# Patient Record
Sex: MALE
[De-identification: names, ages, dates, MRNs, and addresses within clinical notes are randomized per-mention and may not be internally consistent; named-entity substitution may affect disease eponyms.]

---

## 2018-04-30 ENCOUNTER — HOSPITAL ENCOUNTER (OUTPATIENT)
Dept: HOSPITAL 95 - PLD | Age: 83
End: 2018-04-30
Attending: DERMATOLOGY
Payer: MEDICARE

## 2018-04-30 DIAGNOSIS — L72.0: ICD-10-CM

## 2018-04-30 DIAGNOSIS — C44.41: Primary | ICD-10-CM

## 2019-04-15 ENCOUNTER — HOSPITAL ENCOUNTER (OUTPATIENT)
Dept: HOSPITAL 95 - PLD | Age: 84
Discharge: HOME | End: 2019-04-15
Attending: DERMATOLOGY
Payer: MEDICARE

## 2019-04-15 DIAGNOSIS — D48.5: Primary | ICD-10-CM

## 2019-09-03 ENCOUNTER — HOSPITAL ENCOUNTER (OUTPATIENT)
Dept: HOSPITAL 95 - PLD | Age: 84
Discharge: HOME | End: 2019-09-03
Attending: DERMATOLOGY
Payer: MEDICARE

## 2019-09-03 DIAGNOSIS — D48.5: Primary | ICD-10-CM

## 2019-10-21 ENCOUNTER — HOSPITAL ENCOUNTER (OUTPATIENT)
Dept: HOSPITAL 95 - PLD | Age: 84
Discharge: HOME | End: 2019-10-21
Attending: DERMATOLOGY
Payer: MEDICARE

## 2019-10-21 DIAGNOSIS — C44.319: Primary | ICD-10-CM

## 2020-04-22 ENCOUNTER — HOSPITAL ENCOUNTER (OUTPATIENT)
Dept: HOSPITAL 95 - LAB SHORT | Age: 85
Discharge: HOME | End: 2020-04-22
Attending: DERMATOLOGY
Payer: MEDICARE

## 2020-04-22 DIAGNOSIS — D36.7: Primary | ICD-10-CM

## 2020-09-09 ENCOUNTER — HOSPITAL ENCOUNTER (OUTPATIENT)
Dept: HOSPITAL 95 - PLD | Age: 85
Discharge: HOME | End: 2020-09-09
Attending: DERMATOLOGY
Payer: MEDICARE

## 2020-09-09 DIAGNOSIS — D48.5: Primary | ICD-10-CM

## 2021-03-08 ENCOUNTER — HOSPITAL ENCOUNTER (OUTPATIENT)
Dept: HOSPITAL 95 - LAB SHORT | Age: 86
End: 2021-03-08
Attending: DERMATOLOGY
Payer: MEDICARE

## 2021-03-08 DIAGNOSIS — D48.5: Primary | ICD-10-CM

## 2021-06-16 ENCOUNTER — HOSPITAL ENCOUNTER (OUTPATIENT)
Dept: HOSPITAL 95 - ORSCMMR | Age: 86
Discharge: HOME | End: 2021-06-16
Attending: INTERNAL MEDICINE
Payer: MEDICARE

## 2021-06-16 VITALS — BODY MASS INDEX: 27.3 KG/M2 | WEIGHT: 190.7 LBS | HEIGHT: 70 IN

## 2021-06-16 DIAGNOSIS — R19.4: Primary | ICD-10-CM

## 2021-06-16 DIAGNOSIS — Z79.82: ICD-10-CM

## 2021-06-16 DIAGNOSIS — I25.2: ICD-10-CM

## 2021-06-16 DIAGNOSIS — R63.0: ICD-10-CM

## 2021-06-16 DIAGNOSIS — I25.10: ICD-10-CM

## 2021-06-16 DIAGNOSIS — E11.9: ICD-10-CM

## 2021-06-16 DIAGNOSIS — K57.30: ICD-10-CM

## 2021-06-16 DIAGNOSIS — I10: ICD-10-CM

## 2021-06-16 DIAGNOSIS — K21.9: ICD-10-CM

## 2021-06-16 DIAGNOSIS — Z79.01: ICD-10-CM

## 2021-06-16 DIAGNOSIS — R63.4: ICD-10-CM

## 2021-06-16 DIAGNOSIS — Z79.899: ICD-10-CM

## 2021-06-16 PROCEDURE — A9270 NON-COVERED ITEM OR SERVICE: HCPCS

## 2021-06-16 PROCEDURE — 0DBM8ZX EXCISION OF DESCENDING COLON, VIA NATURAL OR ARTIFICIAL OPENING ENDOSCOPIC, DIAGNOSTIC: ICD-10-PCS | Performed by: INTERNAL MEDICINE

## 2021-06-16 PROCEDURE — 0DBH8ZX EXCISION OF CECUM, VIA NATURAL OR ARTIFICIAL OPENING ENDOSCOPIC, DIAGNOSTIC: ICD-10-PCS | Performed by: INTERNAL MEDICINE

## 2021-06-16 PROCEDURE — 0DB78ZX EXCISION OF STOMACH, PYLORUS, VIA NATURAL OR ARTIFICIAL OPENING ENDOSCOPIC, DIAGNOSTIC: ICD-10-PCS | Performed by: INTERNAL MEDICINE

## 2021-06-16 PROCEDURE — 0DBK8ZX EXCISION OF ASCENDING COLON, VIA NATURAL OR ARTIFICIAL OPENING ENDOSCOPIC, DIAGNOSTIC: ICD-10-PCS | Performed by: INTERNAL MEDICINE

## 2021-06-16 PROCEDURE — 0DB98ZX EXCISION OF DUODENUM, VIA NATURAL OR ARTIFICIAL OPENING ENDOSCOPIC, DIAGNOSTIC: ICD-10-PCS | Performed by: INTERNAL MEDICINE

## 2021-06-16 PROCEDURE — 0DB48ZX EXCISION OF ESOPHAGOGASTRIC JUNCTION, VIA NATURAL OR ARTIFICIAL OPENING ENDOSCOPIC, DIAGNOSTIC: ICD-10-PCS | Performed by: INTERNAL MEDICINE

## 2021-06-16 NOTE — NUR
06/16/21 1136 Blocher,David T
History, Chart, Medications and Allergies reviewed before start of
procedure. MONITOR INTACT WITH CONTINUOUS PULSE OXIMETRY AND
INTERMITTENT BP. 3-LEAD EKG REVIEWED WITH PHYSICIAN PRIOR TO START OF
PROCEDURE. O2 VIA N/C INTACT THROUGHOUT SEDATION/PROCEDURE. HURRICAINE
SPRAY TO OROPHARYX. Bite Block Placed. PATIENT DETERMINED TO BE ASA
APPROPRIATE FOR PROPOFOL SEDATION PRIOR TO START OF PROCEDURE BY DR. BAILEY.

## 2021-06-16 NOTE — NUR
Patient up to Ambulate independently. Gait steady.
Discharge instructions reviewed with patient. Patient verbalizes understanding.
Copy given to patient to take home.
Patient States Post-Procedure ride home has been arranged.
Discharged via wheelchair to private car for ride home. D/C instructions given
to wife over phone as well. Pt and wife deny questions. IV D/C'd, cath intact,
dressing applied.

## 2021-06-27 ENCOUNTER — HOSPITAL ENCOUNTER (OUTPATIENT)
Dept: HOSPITAL 95 - MEDS | Age: 86
Setting detail: OBSERVATION
Discharge: HOME | End: 2021-06-27
Attending: INTERNAL MEDICINE | Admitting: INTERNAL MEDICINE
Payer: MEDICARE

## 2021-06-27 VITALS — HEIGHT: 70 IN | WEIGHT: 184.28 LBS | BODY MASS INDEX: 26.38 KG/M2

## 2021-06-27 DIAGNOSIS — K57.30: ICD-10-CM

## 2021-06-27 DIAGNOSIS — I10: ICD-10-CM

## 2021-06-27 DIAGNOSIS — I25.10: ICD-10-CM

## 2021-06-27 DIAGNOSIS — K62.5: Primary | ICD-10-CM

## 2021-06-27 DIAGNOSIS — K63.5: ICD-10-CM

## 2021-06-27 DIAGNOSIS — I25.2: ICD-10-CM

## 2021-06-27 DIAGNOSIS — Z95.5: ICD-10-CM

## 2021-06-27 DIAGNOSIS — Z79.82: ICD-10-CM

## 2021-06-27 DIAGNOSIS — Z86.010: ICD-10-CM

## 2021-06-27 DIAGNOSIS — E11.9: ICD-10-CM

## 2021-06-27 DIAGNOSIS — Z79.84: ICD-10-CM

## 2021-06-27 PROCEDURE — G0379 DIRECT REFER HOSPITAL OBSERV: HCPCS

## 2021-06-27 PROCEDURE — G0378 HOSPITAL OBSERVATION PER HR: HCPCS

## 2021-06-27 NOTE — NUR
06/27/21 0821 Marce Jauregui
History, Chart, Medications and Allergies reviewed before start of
procedure.Patient confirms NPO status and agrees with scheduled
surgery.Patient states colon prep results clear.LUNGS CLEAR T/O TO
AUSCULTATION.MONITOR INTACT WITH CONTINUOUS PULSE OXIMETRY AND
INTERMITTENT BP.3-LEAD EKG REVIEWED WITH PHYSICIAN PRIOR TO START OF
PROCEDURE.O2 VIA N/C INTACT THROUGHOUT SEDATION/PROCEDURE.

## 2021-07-23 ENCOUNTER — HOSPITAL ENCOUNTER (EMERGENCY)
Dept: HOSPITAL 95 - ER | Age: 86
Discharge: HOME | End: 2021-07-23
Payer: MEDICARE

## 2021-07-23 VITALS — HEIGHT: 70 IN | WEIGHT: 191.01 LBS | BODY MASS INDEX: 27.35 KG/M2

## 2021-07-23 DIAGNOSIS — Z79.899: ICD-10-CM

## 2021-07-23 DIAGNOSIS — M54.6: Primary | ICD-10-CM

## 2021-07-23 DIAGNOSIS — Z88.8: ICD-10-CM

## 2021-07-23 DIAGNOSIS — I10: ICD-10-CM

## 2021-07-23 DIAGNOSIS — I25.10: ICD-10-CM

## 2021-07-23 DIAGNOSIS — E11.22: ICD-10-CM

## 2021-07-23 DIAGNOSIS — Z79.02: ICD-10-CM

## 2021-07-23 LAB
ALBUMIN SERPL BCP-MCNC: 3.9 G/DL (ref 3.4–5)
ALBUMIN/GLOB SERPL: 1.1 {RATIO} (ref 0.8–1.8)
ALT SERPL W P-5'-P-CCNC: 27 U/L (ref 12–78)
ANION GAP SERPL CALCULATED.4IONS-SCNC: 4 MMOL/L (ref 6–16)
AST SERPL W P-5'-P-CCNC: 23 U/L (ref 12–37)
BASOPHILS # BLD AUTO: 0.04 K/MM3 (ref 0–0.23)
BASOPHILS NFR BLD AUTO: 0 % (ref 0–2)
BILIRUB SERPL-MCNC: 0.5 MG/DL (ref 0.1–1)
BUN SERPL-MCNC: 15 MG/DL (ref 8–24)
CALCIUM SERPL-MCNC: 8.9 MG/DL (ref 8.5–10.1)
CHLORIDE SERPL-SCNC: 101 MMOL/L (ref 98–108)
CO2 SERPL-SCNC: 28 MMOL/L (ref 21–32)
CREAT SERPL-MCNC: 1.17 MG/DL (ref 0.6–1.2)
DEPRECATED RDW RBC AUTO: 43.6 FL (ref 35.1–46.3)
EOSINOPHIL # BLD AUTO: 0.17 K/MM3 (ref 0–0.68)
EOSINOPHIL NFR BLD AUTO: 1 % (ref 0–6)
ERYTHROCYTE [DISTWIDTH] IN BLOOD BY AUTOMATED COUNT: 13.5 % (ref 11.7–14.2)
GLOBULIN SER CALC-MCNC: 3.7 G/DL (ref 2.2–4)
GLUCOSE SERPL-MCNC: 109 MG/DL (ref 70–99)
HCT VFR BLD AUTO: 34.7 % (ref 37–53)
HGB BLD-MCNC: 11.7 G/DL (ref 13.5–17.5)
IMM GRANULOCYTES # BLD AUTO: 0.09 K/MM3 (ref 0–0.1)
IMM GRANULOCYTES NFR BLD AUTO: 1 % (ref 0–1)
LYMPHOCYTES # BLD AUTO: 1.64 K/MM3 (ref 0.84–5.2)
LYMPHOCYTES NFR BLD AUTO: 10 % (ref 21–46)
MCHC RBC AUTO-ENTMCNC: 33.7 G/DL (ref 31.5–36.5)
MCV RBC AUTO: 89 FL (ref 80–100)
MONOCYTES # BLD AUTO: 1.54 K/MM3 (ref 0.16–1.47)
MONOCYTES NFR BLD AUTO: 10 % (ref 4–13)
NEUTROPHILS # BLD AUTO: 12.64 K/MM3 (ref 1.96–9.15)
NEUTROPHILS NFR BLD AUTO: 78 % (ref 41–73)
NRBC # BLD AUTO: 0.02 K/MM3 (ref 0–0.02)
NRBC BLD AUTO-RTO: 0.1 /100 WBC (ref 0–0.2)
PLATELET # BLD AUTO: 248 K/MM3 (ref 150–400)
POTASSIUM SERPL-SCNC: 3.6 MMOL/L (ref 3.5–5.5)
PROT SERPL-MCNC: 7.6 G/DL (ref 6.4–8.2)
SODIUM SERPL-SCNC: 133 MMOL/L (ref 136–145)
TROPONIN I SERPL-MCNC: <0.015 NG/ML (ref 0–0.04)

## 2022-03-26 ENCOUNTER — HOSPITAL ENCOUNTER (EMERGENCY)
Dept: HOSPITAL 95 - ER | Age: 87
Discharge: HOME | End: 2022-03-26
Payer: MEDICARE

## 2022-03-26 VITALS — WEIGHT: 192 LBS | HEIGHT: 70 IN | BODY MASS INDEX: 27.49 KG/M2

## 2022-03-26 DIAGNOSIS — D64.9: ICD-10-CM

## 2022-03-26 DIAGNOSIS — I25.2: ICD-10-CM
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
## 2022-03-26 DIAGNOSIS — I10: ICD-10-CM

## 2022-03-26 DIAGNOSIS — S29.012A: Primary | ICD-10-CM

## 2022-03-26 DIAGNOSIS — E11.9: ICD-10-CM

## 2022-03-26 DIAGNOSIS — Z79.899: ICD-10-CM

## 2022-03-26 DIAGNOSIS — R07.9: ICD-10-CM

## 2022-03-26 LAB
ALBUMIN SERPL BCP-MCNC: 3.8 G/DL (ref 3.4–5)
ALBUMIN/GLOB SERPL: 1 {RATIO} (ref 0.8–1.8)
ALT SERPL W P-5'-P-CCNC: 26 U/L (ref 12–78)
ANION GAP SERPL CALCULATED.4IONS-SCNC: 5 MMOL/L (ref 6–16)
AST SERPL W P-5'-P-CCNC: 16 U/L (ref 12–37)
BASOPHILS # BLD AUTO: 0.04 K/MM3 (ref 0–0.23)
BASOPHILS NFR BLD AUTO: 1 % (ref 0–2)
BILIRUB SERPL-MCNC: 0.4 MG/DL (ref 0.1–1)
BUN SERPL-MCNC: 19 MG/DL (ref 8–24)
CALCIUM SERPL-MCNC: 8.9 MG/DL (ref 8.5–10.1)
CHLORIDE SERPL-SCNC: 107 MMOL/L (ref 98–108)
CO2 SERPL-SCNC: 26 MMOL/L (ref 21–32)
CREAT SERPL-MCNC: 1.56 MG/DL (ref 0.6–1.2)
DEPRECATED RDW RBC AUTO: 44.6 FL (ref 35.1–46.3)
EOSINOPHIL # BLD AUTO: 0.2 K/MM3 (ref 0–0.68)
EOSINOPHIL NFR BLD AUTO: 2 % (ref 0–6)
ERYTHROCYTE [DISTWIDTH] IN BLOOD BY AUTOMATED COUNT: 14.7 % (ref 11.7–14.2)
GLOBULIN SER CALC-MCNC: 3.9 G/DL (ref 2.2–4)
GLUCOSE SERPL-MCNC: 98 MG/DL (ref 70–99)
HCT VFR BLD AUTO: 34.8 % (ref 37–53)
HGB BLD-MCNC: 10.7 G/DL (ref 13.5–17.5)
IMM GRANULOCYTES # BLD AUTO: 0.02 K/MM3 (ref 0–0.1)
IMM GRANULOCYTES NFR BLD AUTO: 0 % (ref 0–1)
LYMPHOCYTES # BLD AUTO: 2.01 K/MM3 (ref 0.84–5.2)
LYMPHOCYTES NFR BLD AUTO: 24 % (ref 21–46)
MCHC RBC AUTO-ENTMCNC: 30.7 G/DL (ref 31.5–36.5)
MCV RBC AUTO: 83 FL (ref 80–100)
MONOCYTES # BLD AUTO: 1.24 K/MM3 (ref 0.16–1.47)
MONOCYTES NFR BLD AUTO: 15 % (ref 4–13)
NEUTROPHILS # BLD AUTO: 4.89 K/MM3 (ref 1.96–9.15)
NEUTROPHILS NFR BLD AUTO: 58 % (ref 41–73)
NRBC # BLD AUTO: 0 K/MM3 (ref 0–0.02)
NRBC BLD AUTO-RTO: 0 /100 WBC (ref 0–0.2)
PLATELET # BLD AUTO: 291 K/MM3 (ref 150–400)
POTASSIUM SERPL-SCNC: 4 MMOL/L (ref 3.5–5.5)
PROT SERPL-MCNC: 7.7 G/DL (ref 6.4–8.2)
SODIUM SERPL-SCNC: 138 MMOL/L (ref 136–145)

## 2022-04-12 ENCOUNTER — HOSPITAL ENCOUNTER (OUTPATIENT)
Dept: HOSPITAL 95 - LAB SHORT | Age: 87
Discharge: HOME | End: 2022-04-12
Attending: FAMILY MEDICINE
Payer: MEDICARE

## 2022-04-12 DIAGNOSIS — I50.9: Primary | ICD-10-CM

## 2022-04-12 DIAGNOSIS — E78.5: ICD-10-CM

## 2022-04-12 LAB
ALBUMIN SERPL BCP-MCNC: 4 G/DL (ref 3.4–5)
ALBUMIN/GLOB SERPL: 1.1 {RATIO} (ref 0.8–1.8)
ALT SERPL W P-5'-P-CCNC: 26 U/L (ref 12–78)
ANION GAP SERPL CALCULATED.4IONS-SCNC: 5 MMOL/L (ref 6–16)
AST SERPL W P-5'-P-CCNC: 18 U/L (ref 12–37)
BASOPHILS # BLD AUTO: 0.05 K/MM3 (ref 0–0.23)
BASOPHILS NFR BLD AUTO: 1 % (ref 0–2)
BILIRUB SERPL-MCNC: 0.4 MG/DL (ref 0.1–1)
BUN SERPL-MCNC: 19 MG/DL (ref 8–24)
CALCIUM SERPL-MCNC: 8.9 MG/DL (ref 8.5–10.1)
CHLORIDE SERPL-SCNC: 103 MMOL/L (ref 98–108)
CHOLEST SERPL-MCNC: 108 MG/DL (ref 50–200)
CHOLEST/HDLC SERPL: 2.7 {RATIO}
CO2 SERPL-SCNC: 28 MMOL/L (ref 21–32)
CREAT SERPL-MCNC: 1.52 MG/DL (ref 0.6–1.2)
DEPRECATED RDW RBC AUTO: 42.3 FL (ref 35.1–46.3)
EOSINOPHIL # BLD AUTO: 0.19 K/MM3 (ref 0–0.68)
EOSINOPHIL NFR BLD AUTO: 3 % (ref 0–6)
ERYTHROCYTE [DISTWIDTH] IN BLOOD BY AUTOMATED COUNT: 14.5 % (ref 11.7–14.2)
GLOBULIN SER CALC-MCNC: 3.7 G/DL (ref 2.2–4)
GLUCOSE SERPL-MCNC: 107 MG/DL (ref 70–99)
HCT VFR BLD AUTO: 34.7 % (ref 37–53)
HDLC SERPL-MCNC: 40 MG/DL (ref 39–?)
HGB BLD-MCNC: 10.7 G/DL (ref 13.5–17.5)
IMM GRANULOCYTES # BLD AUTO: 0.03 K/MM3 (ref 0–0.1)
IMM GRANULOCYTES NFR BLD AUTO: 0 % (ref 0–1)
LDLC SERPL CALC-MCNC: 18 MG/DL (ref 0–110)
LDLC/HDLC SERPL: 0.5 {RATIO}
LYMPHOCYTES # BLD AUTO: 2.06 K/MM3 (ref 0.84–5.2)
LYMPHOCYTES NFR BLD AUTO: 30 % (ref 21–46)
MCHC RBC AUTO-ENTMCNC: 30.8 G/DL (ref 31.5–36.5)
MCV RBC AUTO: 80 FL (ref 80–100)
MONOCYTES # BLD AUTO: 1.05 K/MM3 (ref 0.16–1.47)
MONOCYTES NFR BLD AUTO: 15 % (ref 4–13)
NEUTROPHILS # BLD AUTO: 3.48 K/MM3 (ref 1.96–9.15)
NEUTROPHILS NFR BLD AUTO: 51 % (ref 41–73)
NRBC # BLD AUTO: 0 K/MM3 (ref 0–0.02)
NRBC BLD AUTO-RTO: 0 /100 WBC (ref 0–0.2)
PLATELET # BLD AUTO: 303 K/MM3 (ref 150–400)
POTASSIUM SERPL-SCNC: 3.5 MMOL/L (ref 3.5–5.5)
PROT SERPL-MCNC: 7.7 G/DL (ref 6.4–8.2)
SODIUM SERPL-SCNC: 136 MMOL/L (ref 136–145)
TRIGL SERPL-MCNC: 250 MG/DL (ref 30–160)
VLDLC SERPL CALC-MCNC: 50 MG/DL (ref 6–32)

## 2022-07-04 ENCOUNTER — HOSPITAL ENCOUNTER (EMERGENCY)
Dept: HOSPITAL 95 - ER | Age: 87
Discharge: HOME | End: 2022-07-04
Payer: MEDICARE

## 2022-07-04 VITALS — BODY MASS INDEX: 27.3 KG/M2 | WEIGHT: 195 LBS | HEIGHT: 71 IN

## 2022-07-04 DIAGNOSIS — I10: ICD-10-CM

## 2022-07-04 DIAGNOSIS — M25.561: Primary | ICD-10-CM
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
## 2022-07-04 DIAGNOSIS — Z79.02: ICD-10-CM

## 2022-07-04 DIAGNOSIS — Z79.82: ICD-10-CM

## 2022-07-04 DIAGNOSIS — Z79.899: ICD-10-CM

## 2022-07-04 DIAGNOSIS — Z95.5: ICD-10-CM

## 2022-07-04 DIAGNOSIS — Z88.8: ICD-10-CM

## 2022-07-04 DIAGNOSIS — Z87.891: ICD-10-CM

## 2022-07-04 DIAGNOSIS — M25.461: ICD-10-CM

## 2023-06-19 ENCOUNTER — HOSPITAL ENCOUNTER (OUTPATIENT)
Dept: HOSPITAL 95 - MHTC | Age: 88
Discharge: HOME | End: 2023-06-19
Attending: NURSE PRACTITIONER
Payer: MEDICARE

## 2023-06-19 VITALS — WEIGHT: 201.06 LBS | HEIGHT: 70 IN | BODY MASS INDEX: 28.78 KG/M2

## 2023-06-19 VITALS — SYSTOLIC BLOOD PRESSURE: 151 MMHG | DIASTOLIC BLOOD PRESSURE: 79 MMHG

## 2023-06-19 VITALS — DIASTOLIC BLOOD PRESSURE: 54 MMHG | SYSTOLIC BLOOD PRESSURE: 157 MMHG

## 2023-06-19 VITALS — SYSTOLIC BLOOD PRESSURE: 129 MMHG | DIASTOLIC BLOOD PRESSURE: 69 MMHG

## 2023-06-19 VITALS — SYSTOLIC BLOOD PRESSURE: 128 MMHG | DIASTOLIC BLOOD PRESSURE: 62 MMHG

## 2023-06-19 VITALS — SYSTOLIC BLOOD PRESSURE: 125 MMHG | DIASTOLIC BLOOD PRESSURE: 70 MMHG

## 2023-06-19 DIAGNOSIS — E78.5: ICD-10-CM

## 2023-06-19 DIAGNOSIS — R53.83: ICD-10-CM

## 2023-06-19 DIAGNOSIS — I11.9: ICD-10-CM

## 2023-06-19 DIAGNOSIS — I25.10: ICD-10-CM

## 2023-06-19 DIAGNOSIS — I35.0: Primary | ICD-10-CM

## 2023-06-19 DIAGNOSIS — R06.09: ICD-10-CM

## 2023-06-19 DIAGNOSIS — Z88.8: ICD-10-CM

## 2023-06-19 PROCEDURE — C1894 INTRO/SHEATH, NON-LASER: HCPCS

## 2023-06-19 PROCEDURE — C1887 CATHETER, GUIDING: HCPCS

## 2023-06-19 PROCEDURE — C1769 GUIDE WIRE: HCPCS

## 2023-06-19 NOTE — NUR
R WRIST TR BAND REMOVED AND PUNCTURE AREA CLEANED /C NS. DOT CLOTH DRSG
PLACED. R WRIST SPLINT REAPPLIED. IV REMOVED. PT AMB OUT OF THE HRT CENTER VIA
/S DIFFICULTY.

## 2023-08-14 ENCOUNTER — HOSPITAL ENCOUNTER (OUTPATIENT)
Dept: HOSPITAL 95 - LAB | Age: 88
Discharge: HOME | End: 2023-08-14
Attending: FAMILY MEDICINE
Payer: MEDICARE

## 2023-08-14 DIAGNOSIS — E78.2: ICD-10-CM

## 2023-08-14 DIAGNOSIS — E11.9: Primary | ICD-10-CM

## 2023-08-14 LAB
ALBUMIN SERPL BCP-MCNC: 3.7 G/DL (ref 3.4–5)
ALBUMIN/GLOB SERPL: 0.9 {RATIO} (ref 0.8–1.8)
ALT SERPL W P-5'-P-CCNC: 23 U/L (ref 12–78)
ANION GAP SERPL CALCULATED.4IONS-SCNC: 7 MMOL/L (ref 6–16)
AST SERPL W P-5'-P-CCNC: 25 U/L (ref 12–37)
BILIRUB SERPL-MCNC: 0.7 MG/DL (ref 0.1–1)
BUN SERPL-MCNC: 11 MG/DL (ref 8–24)
CALCIUM SERPL-MCNC: 8.6 MG/DL (ref 8.5–10.1)
CHLORIDE SERPL-SCNC: 108 MMOL/L (ref 98–108)
CO2 SERPL-SCNC: 25 MMOL/L (ref 21–32)
CREAT SERPL-MCNC: 1.37 MG/DL (ref 0.6–1.2)
GLOBULIN SER CALC-MCNC: 4.1 G/DL (ref 2.2–4)
GLUCOSE SERPL-MCNC: 144 MG/DL (ref 70–99)
POTASSIUM SERPL-SCNC: 3.6 MMOL/L (ref 3.5–5.5)
PROT SERPL-MCNC: 7.8 G/DL (ref 6.4–8.2)
SODIUM SERPL-SCNC: 140 MMOL/L (ref 136–145)

## 2023-09-07 ENCOUNTER — HOSPITAL ENCOUNTER (INPATIENT)
Dept: HOSPITAL 95 - ER | Age: 88
LOS: 3 days | Discharge: HOME | DRG: 308 | End: 2023-09-10
Attending: HOSPITALIST | Admitting: HOSPITALIST
Payer: MEDICARE

## 2023-09-07 VITALS — BODY MASS INDEX: 27.49 KG/M2 | HEIGHT: 70 IN | WEIGHT: 192.02 LBS

## 2023-09-07 DIAGNOSIS — I44.7: ICD-10-CM

## 2023-09-07 DIAGNOSIS — Z95.2: ICD-10-CM

## 2023-09-07 DIAGNOSIS — Z79.899: ICD-10-CM

## 2023-09-07 DIAGNOSIS — I49.1: ICD-10-CM

## 2023-09-07 DIAGNOSIS — D63.1: ICD-10-CM

## 2023-09-07 DIAGNOSIS — Z87.19: ICD-10-CM

## 2023-09-07 DIAGNOSIS — E11.22: ICD-10-CM

## 2023-09-07 DIAGNOSIS — I50.31: ICD-10-CM

## 2023-09-07 DIAGNOSIS — I44.0: Primary | ICD-10-CM

## 2023-09-07 DIAGNOSIS — Z79.82: ICD-10-CM

## 2023-09-07 DIAGNOSIS — I13.0: ICD-10-CM

## 2023-09-07 DIAGNOSIS — Z88.8: ICD-10-CM

## 2023-09-07 DIAGNOSIS — Z95.5: ICD-10-CM

## 2023-09-07 DIAGNOSIS — E78.5: ICD-10-CM

## 2023-09-07 DIAGNOSIS — Z98.890: ICD-10-CM

## 2023-09-07 DIAGNOSIS — N18.31: ICD-10-CM

## 2023-09-07 DIAGNOSIS — Z87.39: ICD-10-CM

## 2023-09-07 DIAGNOSIS — I25.10: ICD-10-CM

## 2023-09-07 DIAGNOSIS — I25.2: ICD-10-CM

## 2023-09-07 DIAGNOSIS — Z90.89: ICD-10-CM

## 2023-09-07 DIAGNOSIS — I44.1: ICD-10-CM

## 2023-09-07 DIAGNOSIS — I35.0: ICD-10-CM

## 2023-09-07 LAB
ALBUMIN SERPL BCP-MCNC: 3.5 G/DL (ref 3.4–5)
ALBUMIN/GLOB SERPL: 0.9 {RATIO} (ref 0.8–1.8)
ALT SERPL W P-5'-P-CCNC: 27 U/L (ref 12–78)
ANION GAP SERPL CALCULATED.4IONS-SCNC: 5 MMOL/L (ref 6–16)
AST SERPL W P-5'-P-CCNC: 16 U/L (ref 12–37)
BASOPHILS # BLD AUTO: 0.05 K/MM3 (ref 0–0.23)
BASOPHILS NFR BLD AUTO: 1 % (ref 0–2)
BILIRUB SERPL-MCNC: 0.3 MG/DL (ref 0.1–1)
BUN SERPL-MCNC: 15 MG/DL (ref 8–24)
CA-I BLD-SCNC: 1.18 MMOL/L (ref 1.1–1.46)
CALCIUM SERPL-MCNC: 9 MG/DL (ref 8.5–10.1)
CHLORIDE BLD-SCNC: 107 MMOL/L (ref 98–108)
CHLORIDE SERPL-SCNC: 112 MMOL/L (ref 98–108)
CO2 BLD-SCNC: 24 MMOL/L (ref 21–32)
CO2 SERPL-SCNC: 24 MMOL/L (ref 21–32)
CREAT BLD-MCNC: 1.6 MG/DL (ref 0.8–1.3)
CREAT SERPL-MCNC: 1.51 MG/DL (ref 0.6–1.2)
DEPRECATED RDW RBC AUTO: 43.3 FL (ref 35.1–46.3)
EOSINOPHIL # BLD AUTO: 0.22 K/MM3 (ref 0–0.68)
EOSINOPHIL NFR BLD AUTO: 3 % (ref 0–6)
ERYTHROCYTE [DISTWIDTH] IN BLOOD BY AUTOMATED COUNT: 14.9 % (ref 11.7–14.2)
GLOBULIN SER CALC-MCNC: 4 G/DL (ref 2.2–4)
GLUCOSE BLDC GLUCOMTR-MCNC: 106 MG/DL (ref 70–99)
GLUCOSE SERPL-MCNC: 109 MG/DL (ref 70–99)
HCT VFR BLD AUTO: 37 % (ref 37–53)
HCT VFR BLD CALC: 35 % (ref 41–53)
HGB BLD CALC-MCNC: 11.9 G/DL (ref 13.5–17.5)
HGB BLD-MCNC: 11.5 G/DL (ref 13.5–17.5)
IMM GRANULOCYTES # BLD AUTO: 0.02 K/MM3 (ref 0–0.1)
IMM GRANULOCYTES NFR BLD AUTO: 0 % (ref 0–1)
LYMPHOCYTES # BLD AUTO: 2.12 K/MM3 (ref 0.84–5.2)
LYMPHOCYTES NFR BLD AUTO: 24 % (ref 21–46)
MCHC RBC AUTO-ENTMCNC: 31.1 G/DL (ref 31.5–36.5)
MCV RBC AUTO: 80 FL (ref 80–100)
MONOCYTES # BLD AUTO: 1.06 K/MM3 (ref 0.16–1.47)
MONOCYTES NFR BLD AUTO: 12 % (ref 4–13)
NEUTROPHILS # BLD AUTO: 5.25 K/MM3 (ref 1.96–9.15)
NEUTROPHILS NFR BLD AUTO: 60 % (ref 41–73)
NRBC # BLD AUTO: 0 K/MM3 (ref 0–0.02)
NRBC BLD AUTO-RTO: 0 /100 WBC (ref 0–0.2)
PLATELET # BLD AUTO: 270 K/MM3 (ref 150–400)
POTASSIUM BLD-SCNC: 3.8 MMOL/L (ref 3.5–5.5)
POTASSIUM SERPL-SCNC: 3.8 MMOL/L (ref 3.5–5.5)
PROT SERPL-MCNC: 7.5 G/DL (ref 6.4–8.2)
SODIUM BLD-SCNC: 140 MMOL/L (ref 135–148)
SODIUM SERPL-SCNC: 141 MMOL/L (ref 136–145)

## 2023-09-07 PROCEDURE — A9270 NON-COVERED ITEM OR SERVICE: HCPCS

## 2023-09-07 PROCEDURE — G0378 HOSPITAL OBSERVATION PER HR: HCPCS

## 2023-09-08 VITALS — SYSTOLIC BLOOD PRESSURE: 154 MMHG | DIASTOLIC BLOOD PRESSURE: 84 MMHG

## 2023-09-08 VITALS — DIASTOLIC BLOOD PRESSURE: 78 MMHG | SYSTOLIC BLOOD PRESSURE: 151 MMHG

## 2023-09-08 VITALS — SYSTOLIC BLOOD PRESSURE: 166 MMHG | DIASTOLIC BLOOD PRESSURE: 82 MMHG

## 2023-09-08 VITALS — DIASTOLIC BLOOD PRESSURE: 86 MMHG | SYSTOLIC BLOOD PRESSURE: 160 MMHG

## 2023-09-08 VITALS — DIASTOLIC BLOOD PRESSURE: 94 MMHG | SYSTOLIC BLOOD PRESSURE: 177 MMHG

## 2023-09-08 VITALS — DIASTOLIC BLOOD PRESSURE: 77 MMHG | SYSTOLIC BLOOD PRESSURE: 149 MMHG

## 2023-09-08 VITALS — DIASTOLIC BLOOD PRESSURE: 80 MMHG | SYSTOLIC BLOOD PRESSURE: 159 MMHG

## 2023-09-08 VITALS — DIASTOLIC BLOOD PRESSURE: 91 MMHG | SYSTOLIC BLOOD PRESSURE: 197 MMHG

## 2023-09-08 VITALS — DIASTOLIC BLOOD PRESSURE: 81 MMHG | SYSTOLIC BLOOD PRESSURE: 152 MMHG

## 2023-09-08 VITALS — SYSTOLIC BLOOD PRESSURE: 150 MMHG | DIASTOLIC BLOOD PRESSURE: 92 MMHG

## 2023-09-08 VITALS — DIASTOLIC BLOOD PRESSURE: 76 MMHG | SYSTOLIC BLOOD PRESSURE: 151 MMHG

## 2023-09-08 VITALS — SYSTOLIC BLOOD PRESSURE: 155 MMHG | DIASTOLIC BLOOD PRESSURE: 82 MMHG

## 2023-09-08 VITALS — SYSTOLIC BLOOD PRESSURE: 169 MMHG | DIASTOLIC BLOOD PRESSURE: 94 MMHG

## 2023-09-08 VITALS — SYSTOLIC BLOOD PRESSURE: 117 MMHG | DIASTOLIC BLOOD PRESSURE: 73 MMHG

## 2023-09-08 VITALS — SYSTOLIC BLOOD PRESSURE: 168 MMHG | DIASTOLIC BLOOD PRESSURE: 99 MMHG

## 2023-09-08 VITALS — DIASTOLIC BLOOD PRESSURE: 118 MMHG | SYSTOLIC BLOOD PRESSURE: 209 MMHG

## 2023-09-08 VITALS — DIASTOLIC BLOOD PRESSURE: 75 MMHG | SYSTOLIC BLOOD PRESSURE: 162 MMHG

## 2023-09-08 VITALS — SYSTOLIC BLOOD PRESSURE: 162 MMHG | DIASTOLIC BLOOD PRESSURE: 80 MMHG

## 2023-09-08 VITALS — SYSTOLIC BLOOD PRESSURE: 164 MMHG | DIASTOLIC BLOOD PRESSURE: 78 MMHG

## 2023-09-08 VITALS — SYSTOLIC BLOOD PRESSURE: 164 MMHG | DIASTOLIC BLOOD PRESSURE: 73 MMHG

## 2023-09-08 VITALS — DIASTOLIC BLOOD PRESSURE: 84 MMHG | SYSTOLIC BLOOD PRESSURE: 152 MMHG

## 2023-09-08 VITALS — DIASTOLIC BLOOD PRESSURE: 86 MMHG | SYSTOLIC BLOOD PRESSURE: 153 MMHG

## 2023-09-08 VITALS — SYSTOLIC BLOOD PRESSURE: 172 MMHG | DIASTOLIC BLOOD PRESSURE: 78 MMHG

## 2023-09-08 VITALS — DIASTOLIC BLOOD PRESSURE: 82 MMHG | SYSTOLIC BLOOD PRESSURE: 160 MMHG

## 2023-09-08 VITALS — DIASTOLIC BLOOD PRESSURE: 80 MMHG | SYSTOLIC BLOOD PRESSURE: 160 MMHG

## 2023-09-08 VITALS — SYSTOLIC BLOOD PRESSURE: 167 MMHG | DIASTOLIC BLOOD PRESSURE: 82 MMHG

## 2023-09-08 VITALS — DIASTOLIC BLOOD PRESSURE: 76 MMHG | SYSTOLIC BLOOD PRESSURE: 142 MMHG

## 2023-09-08 VITALS — DIASTOLIC BLOOD PRESSURE: 78 MMHG | SYSTOLIC BLOOD PRESSURE: 155 MMHG

## 2023-09-08 VITALS — DIASTOLIC BLOOD PRESSURE: 80 MMHG | SYSTOLIC BLOOD PRESSURE: 148 MMHG

## 2023-09-08 VITALS — DIASTOLIC BLOOD PRESSURE: 72 MMHG | SYSTOLIC BLOOD PRESSURE: 153 MMHG

## 2023-09-08 VITALS — DIASTOLIC BLOOD PRESSURE: 86 MMHG | SYSTOLIC BLOOD PRESSURE: 161 MMHG

## 2023-09-08 VITALS — DIASTOLIC BLOOD PRESSURE: 69 MMHG | SYSTOLIC BLOOD PRESSURE: 135 MMHG

## 2023-09-08 VITALS — SYSTOLIC BLOOD PRESSURE: 150 MMHG | DIASTOLIC BLOOD PRESSURE: 104 MMHG

## 2023-09-08 VITALS — DIASTOLIC BLOOD PRESSURE: 63 MMHG | SYSTOLIC BLOOD PRESSURE: 133 MMHG

## 2023-09-08 VITALS — DIASTOLIC BLOOD PRESSURE: 91 MMHG | SYSTOLIC BLOOD PRESSURE: 145 MMHG

## 2023-09-08 VITALS — DIASTOLIC BLOOD PRESSURE: 81 MMHG | SYSTOLIC BLOOD PRESSURE: 169 MMHG

## 2023-09-08 VITALS — SYSTOLIC BLOOD PRESSURE: 145 MMHG | DIASTOLIC BLOOD PRESSURE: 105 MMHG

## 2023-09-08 VITALS — SYSTOLIC BLOOD PRESSURE: 179 MMHG | DIASTOLIC BLOOD PRESSURE: 86 MMHG

## 2023-09-08 VITALS — SYSTOLIC BLOOD PRESSURE: 185 MMHG | DIASTOLIC BLOOD PRESSURE: 91 MMHG

## 2023-09-08 VITALS — SYSTOLIC BLOOD PRESSURE: 168 MMHG | DIASTOLIC BLOOD PRESSURE: 81 MMHG

## 2023-09-08 VITALS — DIASTOLIC BLOOD PRESSURE: 75 MMHG | SYSTOLIC BLOOD PRESSURE: 164 MMHG

## 2023-09-08 VITALS — SYSTOLIC BLOOD PRESSURE: 167 MMHG | DIASTOLIC BLOOD PRESSURE: 100 MMHG

## 2023-09-08 VITALS — SYSTOLIC BLOOD PRESSURE: 150 MMHG | DIASTOLIC BLOOD PRESSURE: 72 MMHG

## 2023-09-08 VITALS — DIASTOLIC BLOOD PRESSURE: 79 MMHG | SYSTOLIC BLOOD PRESSURE: 159 MMHG

## 2023-09-08 VITALS — SYSTOLIC BLOOD PRESSURE: 197 MMHG | DIASTOLIC BLOOD PRESSURE: 88 MMHG

## 2023-09-08 VITALS — DIASTOLIC BLOOD PRESSURE: 85 MMHG | SYSTOLIC BLOOD PRESSURE: 166 MMHG

## 2023-09-08 VITALS — DIASTOLIC BLOOD PRESSURE: 71 MMHG | SYSTOLIC BLOOD PRESSURE: 192 MMHG

## 2023-09-08 VITALS — SYSTOLIC BLOOD PRESSURE: 142 MMHG | DIASTOLIC BLOOD PRESSURE: 78 MMHG

## 2023-09-08 LAB
ALBUMIN SERPL BCP-MCNC: 3.2 G/DL (ref 3.4–5)
ALBUMIN/GLOB SERPL: 0.9 {RATIO} (ref 0.8–1.8)
ALT SERPL W P-5'-P-CCNC: 20 U/L (ref 12–78)
ANION GAP SERPL CALCULATED.4IONS-SCNC: 5 MMOL/L (ref 6–16)
AST SERPL W P-5'-P-CCNC: 17 U/L (ref 12–37)
BASOPHILS # BLD AUTO: 0.05 K/MM3 (ref 0–0.23)
BASOPHILS NFR BLD AUTO: 1 % (ref 0–2)
BILIRUB SERPL-MCNC: 0.3 MG/DL (ref 0.1–1)
BUN SERPL-MCNC: 14 MG/DL (ref 8–24)
CALCIUM SERPL-MCNC: 8.6 MG/DL (ref 8.5–10.1)
CHLORIDE SERPL-SCNC: 110 MMOL/L (ref 98–108)
CO2 SERPL-SCNC: 26 MMOL/L (ref 21–32)
CREAT SERPL-MCNC: 1.42 MG/DL (ref 0.6–1.2)
DEPRECATED RDW RBC AUTO: 42.2 FL (ref 35.1–46.3)
EOSINOPHIL # BLD AUTO: 0.25 K/MM3 (ref 0–0.68)
EOSINOPHIL NFR BLD AUTO: 3 % (ref 0–6)
ERYTHROCYTE [DISTWIDTH] IN BLOOD BY AUTOMATED COUNT: 14.6 % (ref 11.7–14.2)
GLOBULIN SER CALC-MCNC: 3.5 G/DL (ref 2.2–4)
GLUCOSE SERPL-MCNC: 98 MG/DL (ref 70–99)
HCT VFR BLD AUTO: 32.8 % (ref 37–53)
HGB BLD-MCNC: 10.3 G/DL (ref 13.5–17.5)
IMM GRANULOCYTES # BLD AUTO: 0.02 K/MM3 (ref 0–0.1)
IMM GRANULOCYTES NFR BLD AUTO: 0 % (ref 0–1)
LYMPHOCYTES # BLD AUTO: 1.87 K/MM3 (ref 0.84–5.2)
LYMPHOCYTES NFR BLD AUTO: 20 % (ref 21–46)
MCHC RBC AUTO-ENTMCNC: 31.4 G/DL (ref 31.5–36.5)
MCV RBC AUTO: 80 FL (ref 80–100)
MONOCYTES # BLD AUTO: 0.96 K/MM3 (ref 0.16–1.47)
MONOCYTES NFR BLD AUTO: 11 % (ref 4–13)
NEUTROPHILS # BLD AUTO: 6.01 K/MM3 (ref 1.96–9.15)
NEUTROPHILS NFR BLD AUTO: 66 % (ref 41–73)
NRBC # BLD AUTO: 0 K/MM3 (ref 0–0.02)
NRBC BLD AUTO-RTO: 0 /100 WBC (ref 0–0.2)
PLATELET # BLD AUTO: 238 K/MM3 (ref 150–400)
POTASSIUM SERPL-SCNC: 3.5 MMOL/L (ref 3.5–5.5)
PROT SERPL-MCNC: 6.7 G/DL (ref 6.4–8.2)
SODIUM SERPL-SCNC: 141 MMOL/L (ref 136–145)

## 2023-09-08 NOTE — NUR
ASSUMPTION OF CARE/TRANSFER TO PCU 12:
 
ASSUMED CARE OF PT AT 1900. PT IN CHAIR AND TRANSFERRED TO BED INDEPENDENTLY;
PT VERY STEADY ON FEET. PT A&O X 4, PLEASANT AND VERY COOPERATIVE WITH CARE.
PT SR-SB ON MONITOR 48-60'S; ZOLL AND ATROPINE AT BEDSIDE IF PT SUSTAINS
BRADYCARDIA. PT DENIES CHEST PAIN/PRESSURE AND DIZZINESS AT THIS TIME, BUT
STATES WHEN BRADYCARDIC PT DOES BECOME DIZZY. PT ON RA WITH CLEAR LUNG SOUNDS
THROUGHOUT; SPO2 98< AND DENIES SOB. PT ABD ROUND, SOFT AND HYPOACTIVE BOWEL
SOUNDS NOTED; PT TOLERATING PO INTAKE. PT USING URINAL INDEPENDENTLY; URINE
CLEAR, YELLOW. SKIN INTACT AND EDEMA 1+ IN BLE NOTED. PIV X 2 THAT ARE SALINE
LOCKED. PT STATUS CHANGE TO PCU AT START OF SHIFT AND REPORT GIVEN TO ANABEL TREVINO TO ASSUME CARE. PT SPOKE WITH WIFE ON PHONE AND UPDATED HER ON STATUS
CHANGE AND HIS NEW ROOM. PT LEFT THE UNIT @ 2100 WITH PCT; ALL BELONGINGS
TRANSFERRED WITH PT.

## 2023-09-08 NOTE — NUR
NEW ER PATIENT TO ICU 8/SHIFT SUMMARY:
 
PT ARRIVED TO THE UNIT AT 0045; PT ADMITTED DUE TO BRADYCARDIA DOWN TO THE
30'S. 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
FOLLOW-UP APPOINTMENT ON 09/07/23 HE HAD A Z10 PATCH PLACED. PT RETURNED HOME
AND AROUND 2130 RECIEVED A CALL STATING THAT THE PT WAS BECOMING SEVERELY
BRADYCARDIC AND NEEDING TO GO TO [facility] FOR EVALUATION. PT HAD ONE EPISODE OF
BRADYCARDIA DOWN TO THE 30'S IN THE ER BUT SINCE ARRIVAL TO THE UNIT HAS HAD
NO EPISODES OF BRADYCARDIA. PT HAS BEEN SR ON MONITOR WITH HR 60'S AND SBP
140-150'S; AT THIS TIME PT DENIES CHEST PAIN/PRESSURE AND DIZZINESS. PT HAS
BEEN A&O X 4, USING CALL LIGHT APPROPRIATELY AND HAS INDEPENDENT BED MOBILITY.
PT ON RA WITH SPO2 94<, LUNG SOUNDS CLEAR THROUGHOUT AND PT DENIES SOB AT THIS
TIME. PT HAS BEEN NPO SINCE ARRIVAL; ABD SOFT, NON-TENDER, BOWEL SOUNDS IN ALL
QUADRANTS. PT USING URINAL AT BEDSIDE INDEPENDENTLY FOR VOIDING; URINE CLEAR,
YELLOW AND  OUTPUT THIS SHIFT. SKIN WARM AND INTACT, TWO BRUISES NOTED
TO BILATERAL GROIN SITES R/T TAVR PROCEDURE. PT HAS NS @ 75 MLS/HR GOING. PT
HAS BEEN SLEEPING SINCE SETTLING IN ROOM. BED LOWERED, CALL LIGHT IN REACH,
WILL CONTINUE TO MONITOR UNTIL ONCOMING RN ARRIVES.

## 2023-09-08 NOTE — NUR
Shift summary. Pt in chair at bedside. On RA, alert and oriented. Pt had
multiple bradycardic episodes throughout shift, zoll monitor and atropine at
bedside. Pt reported some feeling of fatigue during low HR episodes. Otherwise
pt hypertensive during shift, norvasc added for additional BP control, see
emar. Pt reports that his BP always rises during hospital or DR. visits. No
other events this shift. Pt up to chair multiple times, independent in bed.
See chart for further details, will report off to nightshift RN.

## 2023-09-09 VITALS — SYSTOLIC BLOOD PRESSURE: 166 MMHG | DIASTOLIC BLOOD PRESSURE: 81 MMHG

## 2023-09-09 VITALS — SYSTOLIC BLOOD PRESSURE: 175 MMHG | DIASTOLIC BLOOD PRESSURE: 93 MMHG

## 2023-09-09 VITALS — DIASTOLIC BLOOD PRESSURE: 77 MMHG | SYSTOLIC BLOOD PRESSURE: 135 MMHG

## 2023-09-09 VITALS — SYSTOLIC BLOOD PRESSURE: 133 MMHG | DIASTOLIC BLOOD PRESSURE: 73 MMHG

## 2023-09-09 VITALS — SYSTOLIC BLOOD PRESSURE: 160 MMHG | DIASTOLIC BLOOD PRESSURE: 80 MMHG

## 2023-09-09 VITALS — DIASTOLIC BLOOD PRESSURE: 83 MMHG | SYSTOLIC BLOOD PRESSURE: 152 MMHG

## 2023-09-09 NOTE — NUR
REASSESSMENT
PT HAS BEEN SR WITH RATE IN THE 60S THIS MORNING. LATER THIS MORNING WHEN PT
WENT FOR A WALK THE MONITOR SHOWED THAT HE HAD AN INCREASE IN PVC AND POSSIBLY
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 PT DENIES FEELING ANY
CHANGES. DENIES ANY DIZZINESS OR SHORTNESS OF BREATH WHILE WALKING. BP WAS
ELEVATED THIS AM, BUT IMPROVED AFTER HIS AM MEDS. PLAN OF CARE DISCUSSED WITH
PT AT LENGTH THIS AM AS HE FELT THAT NO HAD DISCUSSED WITH HIM WHAT WAS GOING
ON. DR. CAMEORN YET TO ROUND TODAY. PT'S WIFE AT THE BEDSIDE CURRENTLY AND WAS
UPDATED ALSO. CONTINUING TO MONITOR.

## 2023-09-09 NOTE — NUR
ASSUMPTION OF CARE AND SHIFT SUMMARY
 
PT TRANSFERRED TO ICU TO PCU, ARRIVED AT 2105. PT A&O X4. VSS; ALTHOUGH SBP
ELEVATED . PT ON RA, SPO2 99%. PT DENIES SOB, CP OR PRESSURE. PT DENIES
OTHER SX, EXCEPT REPORTS WHEN PREVIOUS EPISODES OF BRADYCARDIA OCCURED HE DID
"FEEL LIGHTHEADED AND DIZZY". HR IN 60'S. PER TELE TECH, OCCASSIONAL 2 DEGREE
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 PT PLEASANT AND COOPERATIVE WITH CARE. ORIENTED TO
ROOM AND CALL LIGHT IN REACH. THIS RN REVIEWED ICU NURSE SHIFT SUMMARY AND
AGREES WITH ASSESSMENT FINDINGS, NO CHANGES. PT HAS ZIO PATCH IN PLACE, THAT
WAS PLACED BY CARDIOLOGY OUTPT.
 
PT REMAINS A&O X4. VSS. HRR SR/SB 50 - 60'S, OCCASSIONAL TOUCH DOWN TO 47-48
BUT DOES NOT SUSTAIN. NO ACUTE CHANGES. PT STILL CONTINUES TO DENY SX DURING
EPISODES. PT UP USING RESTROOM W/SUPERVISION. PT DENIES FEELING DIZZY OR
LIGHTHEADED WHEN UP. SPO2 MAINTAINING >97% ON RA. CALL LIGHT IN REACH. WILL
UPDATE ONCOMING RN

## 2023-09-09 NOTE — NUR
SHIFT SUMMARY
PT HAD MORE ECTOPY THIS AFTERNOON AFTER HIS WALK WHEN HE WAS SITTING UP IN THE
CHAIR AND WAS ALSO DROPPING BEATS WITH HIS HR DROPPING TO THE 30S. PT ONLY HAD
VERY SLIGHT SYMPTOMS STATING HE MAYBE FELT A LITTLE FUZZY IN HIS HEAD. BP WAS
STABLE. ONCE PT WAS BACK IN BED THE FREQUENCY OF RHYTHM CHANGES DROPPED. DR. CAMERON NOTIFIED OF THIS AND GAVE ORDERS FOR EKG MONDAY MORNING, NPO AT MIDNIGHT
SUNDAY IN CASE OF PACER PLACEMENT. ALL OF THIS RELAYED BACK TO PT. LUNGS
REMAIN CLEAR, RA. VOIDING INDEPENDENTLY, GOOD APPETITE. CONTINUING TO MONITOR.

## 2023-09-10 VITALS — DIASTOLIC BLOOD PRESSURE: 89 MMHG | SYSTOLIC BLOOD PRESSURE: 148 MMHG

## 2023-09-10 VITALS — SYSTOLIC BLOOD PRESSURE: 149 MMHG | DIASTOLIC BLOOD PRESSURE: 71 MMHG

## 2023-09-10 VITALS — SYSTOLIC BLOOD PRESSURE: 156 MMHG | DIASTOLIC BLOOD PRESSURE: 83 MMHG

## 2023-09-10 VITALS — SYSTOLIC BLOOD PRESSURE: 146 MMHG | DIASTOLIC BLOOD PRESSURE: 81 MMHG

## 2023-09-10 NOTE — NUR
PT DISCHARGE TO HOME WITH DISCHARGE ORDERS. DR CAMERON SAW PT THIS MORNING EKG
WAS DONE AND WAS REVIEWED, DR CAMERON CLEARED PT TO GO HOME. PT TO FF-UP WITH
[facility]IN WITHIN A WEEK INSTRUCTED TO CALL AND MAKE APPT TOMORROW,
PRESCIPTION SENT TO [facility]. HOLTER MONITOR REMAINED IN PLACE
TO FOLLOW INSTRUCTIONS PER ORDER. PT DENIES ANY PAIN/DISCOMFORT PT HAS BEEN
INDEPENDENT IN THE ROOM HEART RATE WITH AV BLOCK SR 60-90'S. 'S, ON RA.
NO OTHER ISSUES REPORTED ALL BELONGINGS SENT WITH THE PT, ACCOMPANIED VIA
WHEELCHAIR FOR TRANSPORT, WIFE CAME TO  PT Awake/Cooperative/Alert

## 2023-09-10 NOTE — NUR
SHIFT SUMMARY
 
PT REMAINS A&O X4. VSS THROUGHOUT SHIFT. PT CONTINUES TO HAVE EPISODES OF 2
DEGREE HB. HR 60 - 70'S, OCCASSIONAL HR IN 57-58 WHEN AT REST. PT
ASYMPTOMATIC. NO ACUTE CHANGES DURING SHIFT. PT INDEPENDENT IN ROOM, AMBULATES
INDEPENDENTLY TO RESTROOM W/DIFFICULTY. PT PLEASANT AND COOPERATIVE WITH CARE.
PT RESTED WELL THROUGHOUT SHIFT, CALL LIGHT IN REACH. WILL UPDATE ONCOMING RN

## 2025-02-27 ENCOUNTER — HOSPITAL ENCOUNTER (OUTPATIENT)
Dept: HOSPITAL 95 - ORSCSDS | Age: 89
Discharge: HOME | End: 2025-02-27
Attending: OPHTHALMOLOGY
Payer: MEDICARE

## 2025-02-27 VITALS — WEIGHT: 185.63 LBS | HEIGHT: 70 IN | BODY MASS INDEX: 26.57 KG/M2

## 2025-02-27 VITALS — SYSTOLIC BLOOD PRESSURE: 149 MMHG | DIASTOLIC BLOOD PRESSURE: 89 MMHG

## 2025-02-27 DIAGNOSIS — H52.202: ICD-10-CM

## 2025-02-27 DIAGNOSIS — Z87.891: ICD-10-CM

## 2025-02-27 DIAGNOSIS — H40.1131: ICD-10-CM

## 2025-02-27 DIAGNOSIS — H25.813: ICD-10-CM

## 2025-02-27 DIAGNOSIS — I10: ICD-10-CM

## 2025-02-27 DIAGNOSIS — Z79.82: ICD-10-CM

## 2025-02-27 DIAGNOSIS — I25.10: ICD-10-CM

## 2025-02-27 DIAGNOSIS — I25.2: ICD-10-CM

## 2025-02-27 DIAGNOSIS — H02.831: ICD-10-CM

## 2025-02-27 DIAGNOSIS — Z79.899: ICD-10-CM

## 2025-02-27 DIAGNOSIS — E11.36: Primary | ICD-10-CM

## 2025-02-27 DIAGNOSIS — H02.834: ICD-10-CM

## 2025-02-27 DIAGNOSIS — Z79.01: ICD-10-CM

## 2025-02-27 DIAGNOSIS — E78.5: ICD-10-CM

## 2025-02-27 PROCEDURE — V2632 POST CHMBR INTRAOCULAR LENS: HCPCS

## 2025-02-27 PROCEDURE — 08RK3JZ REPLACEMENT OF LEFT LENS WITH SYNTHETIC SUBSTITUTE, PERCUTANEOUS APPROACH: ICD-10-PCS | Performed by: OPHTHALMOLOGY

## 2025-03-06 ENCOUNTER — HOSPITAL ENCOUNTER (OUTPATIENT)
Dept: HOSPITAL 95 - ORSCSDS | Age: 89
Discharge: HOME | End: 2025-03-06
Attending: OPHTHALMOLOGY
Payer: MEDICARE

## 2025-03-06 VITALS — HEIGHT: 70 IN | WEIGHT: 189.82 LBS | BODY MASS INDEX: 27.17 KG/M2

## 2025-03-06 VITALS — SYSTOLIC BLOOD PRESSURE: 151 MMHG | DIASTOLIC BLOOD PRESSURE: 86 MMHG

## 2025-03-06 DIAGNOSIS — E11.36: Primary | ICD-10-CM

## 2025-03-06 DIAGNOSIS — I25.2: ICD-10-CM

## 2025-03-06 DIAGNOSIS — Z79.84: ICD-10-CM

## 2025-03-06 DIAGNOSIS — I10: ICD-10-CM

## 2025-03-06 DIAGNOSIS — Z95.0: ICD-10-CM

## 2025-03-06 DIAGNOSIS — Z96.1: ICD-10-CM

## 2025-03-06 DIAGNOSIS — H52.201: ICD-10-CM

## 2025-03-06 DIAGNOSIS — I25.10: ICD-10-CM

## 2025-03-06 DIAGNOSIS — Z79.899: ICD-10-CM

## 2025-03-06 DIAGNOSIS — I50.9: ICD-10-CM

## 2025-03-06 DIAGNOSIS — H25.811: ICD-10-CM

## 2025-03-06 PROCEDURE — V2632 POST CHMBR INTRAOCULAR LENS: HCPCS

## 2025-03-06 PROCEDURE — 08RJ3JZ REPLACEMENT OF RIGHT LENS WITH SYNTHETIC SUBSTITUTE, PERCUTANEOUS APPROACH: ICD-10-PCS | Performed by: OPHTHALMOLOGY

## 2025-03-06 NOTE — NUR
03/06/25 1306 Kaitlynn Rnig
PT [id number] UPON ARRIVAL. PATIENT TALKING. DENIES PAIN AND NAUSEA AT THIS
TIME